# Patient Record
Sex: FEMALE | Race: OTHER | HISPANIC OR LATINO | URBAN - METROPOLITAN AREA
[De-identification: names, ages, dates, MRNs, and addresses within clinical notes are randomized per-mention and may not be internally consistent; named-entity substitution may affect disease eponyms.]

---

## 2022-07-25 ENCOUNTER — EMERGENCY (EMERGENCY)
Facility: HOSPITAL | Age: 22
LOS: 1 days | Discharge: HOME | End: 2022-07-25
Attending: EMERGENCY MEDICINE
Payer: MEDICAID

## 2022-07-25 VITALS
HEIGHT: 63 IN | WEIGHT: 199.96 LBS | RESPIRATION RATE: 18 BRPM | DIASTOLIC BLOOD PRESSURE: 87 MMHG | SYSTOLIC BLOOD PRESSURE: 169 MMHG | TEMPERATURE: 98 F | OXYGEN SATURATION: 99 % | HEART RATE: 77 BPM

## 2022-07-25 VITALS — HEART RATE: 74 BPM | DIASTOLIC BLOOD PRESSURE: 84 MMHG | SYSTOLIC BLOOD PRESSURE: 129 MMHG

## 2022-07-25 LAB
ALBUMIN SERPL ELPH-MCNC: 4.1 G/DL — SIGNIFICANT CHANGE UP (ref 3.3–5.2)
ALP SERPL-CCNC: 68 U/L — SIGNIFICANT CHANGE UP (ref 40–120)
ALT FLD-CCNC: 15 U/L — SIGNIFICANT CHANGE UP
ANION GAP SERPL CALC-SCNC: 12 MMOL/L — SIGNIFICANT CHANGE UP (ref 5–17)
AST SERPL-CCNC: 15 U/L — SIGNIFICANT CHANGE UP
BASOPHILS # BLD AUTO: 0.04 K/UL — SIGNIFICANT CHANGE UP (ref 0–0.2)
BASOPHILS NFR BLD AUTO: 0.4 % — SIGNIFICANT CHANGE UP (ref 0–2)
BILIRUB SERPL-MCNC: <0.2 MG/DL — LOW (ref 0.4–2)
BUN SERPL-MCNC: 8.8 MG/DL — SIGNIFICANT CHANGE UP (ref 8–20)
CALCIUM SERPL-MCNC: 9 MG/DL — SIGNIFICANT CHANGE UP (ref 8.6–10.2)
CHLORIDE SERPL-SCNC: 103 MMOL/L — SIGNIFICANT CHANGE UP (ref 98–107)
CO2 SERPL-SCNC: 24 MMOL/L — SIGNIFICANT CHANGE UP (ref 22–29)
CREAT SERPL-MCNC: 0.54 MG/DL — SIGNIFICANT CHANGE UP (ref 0.5–1.3)
EGFR: 133 ML/MIN/1.73M2 — SIGNIFICANT CHANGE UP
EOSINOPHIL # BLD AUTO: 0.1 K/UL — SIGNIFICANT CHANGE UP (ref 0–0.5)
EOSINOPHIL NFR BLD AUTO: 0.9 % — SIGNIFICANT CHANGE UP (ref 0–6)
GLUCOSE SERPL-MCNC: 79 MG/DL — SIGNIFICANT CHANGE UP (ref 70–99)
HCG SERPL-ACNC: <4 MIU/ML — SIGNIFICANT CHANGE UP
HCT VFR BLD CALC: 38.8 % — SIGNIFICANT CHANGE UP (ref 34.5–45)
HGB BLD-MCNC: 13.2 G/DL — SIGNIFICANT CHANGE UP (ref 11.5–15.5)
IMM GRANULOCYTES NFR BLD AUTO: 0.4 % — SIGNIFICANT CHANGE UP (ref 0–1.5)
LIDOCAIN IGE QN: 31 U/L — SIGNIFICANT CHANGE UP (ref 22–51)
LYMPHOCYTES # BLD AUTO: 29.2 % — SIGNIFICANT CHANGE UP (ref 13–44)
LYMPHOCYTES # BLD AUTO: 3.15 K/UL — SIGNIFICANT CHANGE UP (ref 1–3.3)
MCHC RBC-ENTMCNC: 28.4 PG — SIGNIFICANT CHANGE UP (ref 27–34)
MCHC RBC-ENTMCNC: 34 GM/DL — SIGNIFICANT CHANGE UP (ref 32–36)
MCV RBC AUTO: 83.4 FL — SIGNIFICANT CHANGE UP (ref 80–100)
MONOCYTES # BLD AUTO: 0.67 K/UL — SIGNIFICANT CHANGE UP (ref 0–0.9)
MONOCYTES NFR BLD AUTO: 6.2 % — SIGNIFICANT CHANGE UP (ref 2–14)
NEUTROPHILS # BLD AUTO: 6.78 K/UL — SIGNIFICANT CHANGE UP (ref 1.8–7.4)
NEUTROPHILS NFR BLD AUTO: 62.9 % — SIGNIFICANT CHANGE UP (ref 43–77)
PLATELET # BLD AUTO: 261 K/UL — SIGNIFICANT CHANGE UP (ref 150–400)
POTASSIUM SERPL-MCNC: 4.1 MMOL/L — SIGNIFICANT CHANGE UP (ref 3.5–5.3)
POTASSIUM SERPL-SCNC: 4.1 MMOL/L — SIGNIFICANT CHANGE UP (ref 3.5–5.3)
PROT SERPL-MCNC: 7.5 G/DL — SIGNIFICANT CHANGE UP (ref 6.6–8.7)
RBC # BLD: 4.65 M/UL — SIGNIFICANT CHANGE UP (ref 3.8–5.2)
RBC # FLD: 13.2 % — SIGNIFICANT CHANGE UP (ref 10.3–14.5)
SODIUM SERPL-SCNC: 138 MMOL/L — SIGNIFICANT CHANGE UP (ref 135–145)
WBC # BLD: 10.78 K/UL — HIGH (ref 3.8–10.5)
WBC # FLD AUTO: 10.78 K/UL — HIGH (ref 3.8–10.5)

## 2022-07-25 PROCEDURE — 84702 CHORIONIC GONADOTROPIN TEST: CPT

## 2022-07-25 PROCEDURE — 99284 EMERGENCY DEPT VISIT MOD MDM: CPT | Mod: 25

## 2022-07-25 PROCEDURE — 83690 ASSAY OF LIPASE: CPT

## 2022-07-25 PROCEDURE — 76705 ECHO EXAM OF ABDOMEN: CPT | Mod: 26

## 2022-07-25 PROCEDURE — 80053 COMPREHEN METABOLIC PANEL: CPT

## 2022-07-25 PROCEDURE — 76705 ECHO EXAM OF ABDOMEN: CPT

## 2022-07-25 PROCEDURE — 99285 EMERGENCY DEPT VISIT HI MDM: CPT

## 2022-07-25 PROCEDURE — 85025 COMPLETE CBC W/AUTO DIFF WBC: CPT

## 2022-07-25 PROCEDURE — 36415 COLL VENOUS BLD VENIPUNCTURE: CPT

## 2022-07-25 RX ORDER — CALCIUM CARBONATE 500(1250)
2 TABLET ORAL ONCE
Refills: 0 | Status: COMPLETED | OUTPATIENT
Start: 2022-07-25 | End: 2022-07-25

## 2022-07-25 RX ADMIN — Medication 2 TABLET(S): at 18:20

## 2022-07-25 NOTE — ED STATDOCS - PATIENT PORTAL LINK FT
Sent over updated information to Loly Hope (attn:Isabel fax [phone])  Awaiting determination for pt to go to [facility] today  You can access the FollowMyHealth Patient Portal offered by [facility] by registering at the following website: [email or web address]. By joining [employer]’s FollowMyHealth portal, you will also be able to view your health information using other applications (apps) compatible with our system.

## 2022-07-25 NOTE — ED STATDOCS - NSFOLLOWUPINSTRUCTIONS_ED_ALL_ED_FT
- Please follow-up with your primary care doctor in the next 1-2 days.  Please call tomorrow for an appointment.  If you cannot follow-up with your primary care doctor please return to the ED for any urgent issues.  - You were given a copy of the tests performed today.  Please bring the results with you and review them with your primary care doctor.  - Follow up with the gastroenterologist for the abdominal pain in the next 2-3 days.   - If you have any worsening of symptoms or any other concerns please return to the ED immediately.  - Please continue taking your home medications as directed.         Abdominal Pain    Many things can cause abdominal pain. Many times, abdominal pain is not caused by a disease and will improve without treatment. Your health care provider will do a physical exam to determine if there is a dangerous cause of your pain; blood tests and imaging may help determine the cause of your pain. However, in many cases, no cause may be found and you may need further testing as an outpatient. Monitor your abdominal pain for any changes.     SEEK IMMEDIATE MEDICAL CARE IF YOU HAVE ANY OF THE FOLLOWING SYMPTOMS: worsening abdominal pain, uncontrollable vomiting, profuse diarrhea, inability to have bowel movements or pass gas, black or bloody stools, fever accompanying chest pain or back pain, or fainting. These symptoms may represent a serious problem that is an emergency. Do not wait to see if the symptoms will go away. Get medical help right away. Call 911 and do not drive yourself to the hospital. - Please follow-up with your primary care doctor in the next 1-2 days.  Please call tomorrow for an appointment.  If you cannot follow-up with your primary care doctor please return to the ED for any urgent issues.  - You were given a copy of the tests performed today.  Please bring the results with you and review them with your primary care doctor.  - Follow up with the gastroenterologist for the abdominal pain in the next 2-3 days.   - If you have any worsening of symptoms or any other concerns please return to the ED immediately.  - Please continue taking your home medications as directed.     Abdominal Pain    Many things can cause abdominal pain. Many times, abdominal pain is not caused by a disease and will improve without treatment. Your health care provider will do a physical exam to determine if there is a dangerous cause of your pain; blood tests and imaging may help determine the cause of your pain. However, in many cases, no cause may be found and you may need further testing as an outpatient. Monitor your abdominal pain for any changes.     SEEK IMMEDIATE MEDICAL CARE IF YOU HAVE ANY OF THE FOLLOWING SYMPTOMS: worsening abdominal pain, uncontrollable vomiting, profuse diarrhea, inability to have bowel movements or pass gas, black or bloody stools, fever accompanying chest pain or back pain, or fainting. These symptoms may represent a serious problem that is an emergency. Do not wait to see if the symptoms will go away. Get medical help right away. Call 911 and do not drive yourself to the hospital.    - Conor un seguimiento con limon médico de atención primaria en los próximos 1 o 2 rocky. Por favor llame mañana para rufino lynn. Si no puede hacer un seguimiento con limon médico de atención primaria, regrese al servicio de urgencias para cualquier problema urgente.  - Se le entregó rufino copia de las pruebas realizadas hoy. Traiga los resultados con usted y revíselos con limon médico de atención primaria.  - Seguimiento con el gastroenterólogo por el dolor abdominal en los próximos 2-3 días.  - Si tiene algún empeoramiento de los síntomas o cualquier otra inquietud, regrese al servicio de urgencias de inmediato.  - Continúe tomando carol ann medicamentos en el hogar según las indicaciones.    Dolor abdominal    Muchas cosas pueden causar dolor abdominal. Muchas veces, el dolor abdominal no es causado por rufino enfermedad y mejorará sin tratamiento. Limon proveedor de atención médica le hará un examen físico para determinar si existe rufino causa peligrosa de limon dolor; Los análisis de aury y las imágenes pueden ayudar a determinar la causa de limon dolor. Sin embargo, en muchos casos, es posible que no se encuentre la causa y es posible que necesite más pruebas bryan paciente ambulatorio. Supervise limon dolor abdominal para detectar cualquier cambio.    BUSQUE ATENCIÓN MÉDICA INMEDIATA SI TIENE ALGUNO DE LOS SIGUIENTES SÍNTOMAS: empeoramiento del dolor abdominal, vómitos incontrolables, diarrea profusa, incapacidad para defecar o expulsar gases, heces negras o con aury, fiebre que acompaña al dolor de pecho o de espalda, o desmayo. Estos síntomas pueden representar un problema grave que es rufino emergencia. No espere a linnea si los síntomas desaparecen. Obtenga ayuda médica de inmediato. Llame al 911 y no conduzca hasta el hospital.

## 2022-07-25 NOTE — ED ADULT NURSE NOTE - DOES PATIENT HAVE ADVANCE DIRECTIVE
TAKE 1 TABLET BY MOUTH EVERY DAY 30 tablet 1    QUEtiapine (SEROQUEL) 100 MG tablet Take 100 mg by mouth nightly       No current facility-administered medications for this visit. Review of Systems  General: No chills, fatigue, fever, malaise, night sweats, weight gain,  weight loss. Psychological: +anxiety, +depression, No suicidal ideation   Ophthalmic: No blurry vision, decreased vision, double vision, loss of vision  Ear Nose Throat: No hearing loss, tinnitus, phonophobia, sensitivity to smells, vertigo, or vocal changes. Allergy/Immunology: No watery eyes, itchy eyes, frequent infections. Hematological and Lymphatic: No bleeding problems, blood clots, bruising  Endocrine:  No polydypsia, polyuria, temperature intolerance. Respiratory: No cough, shortness of breath, wheezing. Cardiovascular: No syncope, chest pain, dyspnea on exertion,palpitations. Gastrointestinal: No abdominal pain, hematemesis, melena, nausea, vomiting, stool incontinence  Genito-Urinary: No dysuria, hematuria, incontinence   Musculoskeletal: Per HPI  Neurological: Per HPI  Dermatological:  No rash      Physical Exam:   Blood pressure 130/88, pulse 75, height 5' 4\" (1.626 m), weight 200 lb (90.7 kg), SpO2 98 %, not currently breastfeeding. General: The patient is in no apparent distress. HEENT: No rhinorrhea, sneezing, yawning, or lacrimation. No scleral icterus or conjunctival injection. SKIN: No piloerection. No track marks. No rash. Normal turgor. No erythema or ecchymosis. Psychological: Mood and affect are appropriate. Hygiene is appropriate. Cardiovascular:  Heart is regular rate. Peripheral pulses are 1+ at the dorsalis pedis, posterior tibial and radial arteries. There is no edema. Respiratory: Respirations are regular and unlabored. There is no cyanosis. Lymphatic: There is no cervical lymphadenopathy. Gastrointestinal: Soft abdomen, non-tender.   Genitourinary: No costovertebral angle No

## 2022-07-25 NOTE — ED STATDOCS - PROGRESS NOTE DETAILS
PT evaluated by intake physician. HPI/PE/ROS as noted above. Will follow up plan per intake physician.   Pt reassessed. PT evaluated by intake physician. HPI/PE/ROS as noted above. Will follow up plan per intake physician.   Pt reassessed, pt feeling better at this time, vss, pt able to walk, talk and vocalized plan of action. Discussed in depth and explained to pt in depth the next steps that need to be taking including proper follow up with PCP or specialists. Pt will be given GI to f/u with. All incidental findings were discussed with pt as well. Pt verbalized their concerns and all questions were answered. Pt understands dispo and wants discharge. Given good instructions when to return to ED, strict return precautions and importance of f/u.     : kam

## 2022-07-25 NOTE — ED STATDOCS - OBJECTIVE STATEMENT
21 y/o female with no significant PMHx presents with epigastric abdominal pain associated with cramps, bloating for a week. Pain is worse at night. Cramps make her feel like she is short of breath. Mild back pain. Pt states she is attempting to get pregnant. Took 4 tests, all negative. Denies any hormones or medicines. Has not taken any medicines due to uncertainty of pregnancy. No PSHx.     : Mando 21 y/o female with no significant PMHx presents with epigastric abdominal pain associated with cramps, bloating for a week. Pain is worse at night. Cramps make her feel like she is short of breath. Mild back pain. Pt states she is attempting to get pregnant. Took 4 tests, all negative. no n/v/d. No black or bloody tool. No rashes. no travel. no sick contacts. no history of GI illness. Denies any hormones or fertility treatments. Has not taken any medicines due to uncertainty of pregnancy. No PSHx.     : Mando

## 2022-07-25 NOTE — ED STATDOCS - NS ED ATTENDING STATEMENT MOD
This was a shared visit with the LILIYA. I reviewed and verified the documentation and independently performed the documented:

## 2022-07-25 NOTE — ED STATDOCS - CARE PROVIDER_API CALL
Francisco Colon)  Gastroenterology; Internal Medicine  [address] [place] [address]  Phone: [phone]  Fax: [phone]  Follow Up Time:

## 2022-07-25 NOTE — ED STATDOCS - CLINICAL SUMMARY MEDICAL DECISION MAKING FREE TEXT BOX
pt presenting with epigastric pain, bloating, worse at night. mild epigastric tenderness. will check labs, hcg, gallbladder sono, and GI cocktail. Re-assess.

## 2022-07-25 NOTE — ED ADULT TRIAGE NOTE - CHIEF COMPLAINT QUOTE
pt c/o abdominal pain, x 1 week, feels inflammation to epigastric area, c/o SOB from abd pain,   A&Ox3, resp wnl, afraid to take meds because she is trying to get pregnant

## 2022-11-27 PROBLEM — Z00.00 ENCOUNTER FOR PREVENTIVE HEALTH EXAMINATION: Status: ACTIVE | Noted: 2022-11-27

## 2022-11-29 ENCOUNTER — APPOINTMENT (OUTPATIENT)
Dept: GASTROENTEROLOGY | Facility: CLINIC | Age: 22
End: 2022-11-29

## 2023-01-23 ENCOUNTER — OUTPATIENT (OUTPATIENT)
Dept: OUTPATIENT SERVICES | Facility: HOSPITAL | Age: 23
LOS: 1 days | End: 2023-01-23
Payer: MEDICAID

## 2023-01-23 ENCOUNTER — EMERGENCY (EMERGENCY)
Facility: HOSPITAL | Age: 23
LOS: 1 days | Discharge: HOME | End: 2023-01-23
Attending: EMERGENCY MEDICINE
Payer: MEDICAID

## 2023-01-23 VITALS
WEIGHT: 199.96 LBS | RESPIRATION RATE: 18 BRPM | OXYGEN SATURATION: 98 % | HEART RATE: 109 BPM | DIASTOLIC BLOOD PRESSURE: 81 MMHG | SYSTOLIC BLOOD PRESSURE: 130 MMHG | TEMPERATURE: 98 F | HEIGHT: 65 IN

## 2023-01-23 VITALS
SYSTOLIC BLOOD PRESSURE: 112 MMHG | HEART RATE: 92 BPM | RESPIRATION RATE: 16 BRPM | DIASTOLIC BLOOD PRESSURE: 67 MMHG | TEMPERATURE: 98 F

## 2023-01-23 DIAGNOSIS — O47.02 FALSE LABOR BEFORE 37 COMPLETED WEEKS OF GESTATION, SECOND TRIMESTER: ICD-10-CM

## 2023-01-23 PROCEDURE — G0463: CPT

## 2023-01-23 PROCEDURE — 93005 ELECTROCARDIOGRAM TRACING: CPT

## 2023-01-23 PROCEDURE — 59025 FETAL NON-STRESS TEST: CPT

## 2023-01-23 PROCEDURE — 99234 HOSP IP/OBS SM DT SF/LOW 45: CPT

## 2023-01-23 PROCEDURE — 99283 EMERGENCY DEPT VISIT LOW MDM: CPT

## 2023-01-23 PROCEDURE — 93010 ELECTROCARDIOGRAM REPORT: CPT

## 2023-01-23 PROCEDURE — 99284 EMERGENCY DEPT VISIT MOD MDM: CPT

## 2023-01-23 RX ORDER — FAMOTIDINE 10 MG/ML
1 INJECTION INTRAVENOUS
Qty: 28 | Refills: 0
Start: 2023-01-23 | End: 2023-02-05

## 2023-01-23 RX ORDER — CALCIUM CARBONATE 500(1250)
1 TABLET ORAL
Qty: 56 | Refills: 0
Start: 2023-01-23 | End: 2023-02-05

## 2023-01-23 RX ORDER — FAMOTIDINE 10 MG/ML
20 INJECTION INTRAVENOUS ONCE
Refills: 0 | Status: COMPLETED | OUTPATIENT
Start: 2023-01-23 | End: 2023-01-23

## 2023-01-23 RX ORDER — ONDANSETRON 8 MG/1
4 TABLET, FILM COATED ORAL ONCE
Refills: 0 | Status: COMPLETED | OUTPATIENT
Start: 2023-01-23 | End: 2023-01-23

## 2023-01-23 RX ORDER — ACETAMINOPHEN 500 MG
975 TABLET ORAL ONCE
Refills: 0 | Status: COMPLETED | OUTPATIENT
Start: 2023-01-23 | End: 2023-01-23

## 2023-01-23 RX ADMIN — Medication 30 MILLILITER(S): at 04:40

## 2023-01-23 RX ADMIN — ONDANSETRON 4 MILLIGRAM(S): 8 TABLET, FILM COATED ORAL at 04:40

## 2023-01-23 RX ADMIN — Medication 975 MILLIGRAM(S): at 04:40

## 2023-01-23 RX ADMIN — FAMOTIDINE 20 MILLIGRAM(S): 10 INJECTION INTRAVENOUS at 04:40

## 2023-01-23 NOTE — ED PROVIDER NOTE - ATTENDING APP SHARED VISIT CONTRIBUTION OF CARE
Chest pain and throat discomfort c/w GERD, improved with acid suppression. No pregnancy complaints, will be seen in L+D for well check.

## 2023-01-23 NOTE — ED PROVIDER NOTE - OBJECTIVE STATEMENT
21 yo female PMHx 28 weeks GA presents to ED c/o multiple medical complaints. 1: Sore throat x3 days. States pain in throat causes her to throw up. +Difficulty swallowing. +Belching. Slight cough. States feels more like reflux. Medicating with tea. 2: Chest pain x4 hours. Described as constant pressure, with occasional radiation down left arm. Some SOB. No further complaints at this time.   Denies fmhx CAD at young age, h/o blood clots, fevers, congestion, abdominal pain, vaginal bleeding, leg pain/swelling.  OB: [facility]

## 2023-01-23 NOTE — ED PROVIDER NOTE - PATIENT PORTAL LINK FT
You can access the FollowMyHealth Patient Portal offered by [facility] by registering at the following website: [email or web address]. By joining [employer]’s FollowMyHealth portal, you will also be able to view your health information using other applications (apps) compatible with our system.

## 2023-01-23 NOTE — ED PROVIDER NOTE - NSFOLLOWUPINSTRUCTIONS_ED_ALL_ED_FT
- Please proceed directly to L&D.  - Over the counter Pepcid.   - Avoid caffeine, citrus based food/drink, chocolate.   - Please bring all documentation from your ED visit to any related future follow up appointment.  - Please call to schedule follow up appointment with your primary care physician within 24-48 hours.  - Please seek immediate medical attention or return to the ED for any new/worsening, signs/symptoms, or concerns.    Feel better!     - Diríjase directamente a L&D.  - Pepcid de venta clyde.  - Evite la cafeína, alimentos/bebidas a base de cítricos, chocolate.  - Traiga toda la documentación de limon visita al ED a cualquier lynn de seguimiento futura relacionada.  - Llame para programar rufino lynn de seguimiento con limon médico de atención primaria dentro de las 24 a 48 horas.  - Busque atención médica inmediata o regrese al servicio de urgencias por cualquier signo/síntoma o inquietud nuevos/empeorados.    ¡Sentirse mejor!      Enfermedad de reflujo gastroesofágico en los adultos    Gastroesophageal Reflux Disease, Adult       El reflujo gastroesofágico (RGE) ocurre cuando el ácido del estómago sube por el tubo que conecta la boca con el estómago (esófago). Normalmente, la comida baja por el esófago y se mantiene en el estómago, donde se la digiere. Sin embargo, cuando rufino persona tiene ERGE, los alimentos y el ácido estomacal suelen volver al esófago. Si esto se vuelve un problema más grave, a la persona se le puede diagnosticar rufino enfermedad llamada enfermedad de reflujo gastroesofágico (ERGE). La ERGE ocurre cuando el reflujo:  •Sucede a menudo.       •Causa síntomas frecuentes o graves.       •Causa problemas tales bryan daño en el esófago.      Cuando el ácido del estómago entra en contacto con el esófago, el ácido puede provocar inflamación en el esófago. Con el tiempo, pueden formarse pequeños agujeros (úlceras) en el revestimiento del esófago.      ¿Cuáles son las causas?    Esta afección se debe a un problema en el músculo que se encuentra entre el esófago y el estómago (esfínter esofágico inferior, o EEI). Normalmente, el EEI se thuy rufino vez que la comida pasa a través del esófago hasta el estómago. Cuando el EEI se encuentra debilitado o tiene alguna anomalía, no se thuy por completo, y eso permite que tanto la comida bryan el jugo gástrico, que es ácido, vuelvan a subir por el esófago.    El EEI puede debilitarse a causa de ciertas sustancias alimenticias, medicamentos y afecciones, que incluyen:  •El consumo de tabaco.      •Embarazo.      •Tener rufino hernia de hiato.      •Consumo de alcohol.      •Ciertos alimentos y bebidas, bryan café, chocolate, cebollas y menta.        ¿Qué incrementa el riesgo?    Es más probable que tenga esta afección si:  •Tiene un aumento del peso corporal.      •Tiene un trastorno del tejido conjuntivo.      •Zenaida antiinflamatorios no esteroideos (DONALD), bryan el ibuprofeno.        ¿Cuáles son los signos o síntomas?    Los síntomas de esta afección incluyen:  •Acidez estomacal.      •Dificultad o dolor para tragar o la sensación de tener un bulto en la garganta.      •Sabor amargo en la boca.      •Mal aliento y tener gran cantidad de saliva.      •Estómago inflamado o con malestar y eructos.      •Dolor en el pecho. El dolor de pecho puede deberse a distintas afecciones. Es importante que consulte al médico si tiene dolor de pecho.      •Dificultad para respirar o sibilancias.      •Tos chandana (crónica) o tos nocturna.      •Desgaste del esmalte dental.      •Pérdida de peso.        ¿Cómo se diagnostica?    Esta afección se puede diagnosticar en función de los antecedentes médicos y un examen físico. Para determinar si tiene ERGE leve o grave, el médico también puede controlar cómo usted reacciona al tratamiento. También pueden hacerle estudios, que incluyen los siguientes:  •Un estudio para examinarle el estómago y el esófago con rufino cámara pequeña (endoscopía).      •Rufino prueba para medir el brigitte de acidez en el esófago.      •Rufino prueba para medir cuánta presión hay en el esófago.      •Un estudio de deglución con bario común o modificado para linnea la forma, el tamaño y el funcionamiento del esófago.        ¿Cómo se trata?    El tratamiento de esta afección puede variar según la gravedad de los síntomas. El médico puede recomendarle lo siguiente:  •Cambios en la dieta.      •Medicamentos.      •Cirugía.      El objetivo del tratamiento es ayudar a aliviar los síntomas y evitar las complicaciones.      Siga estas instrucciones en limon casa:      Comida y bebida    •Siga la dieta recomendada por el médico. [place] puede incluir evitar ciertos alimentos y bebidas, por ejemplo:  •Café y té barry, con o sin cafeína.      •Bebidas que contengan alcohol.      •Bebidas energéticas y deportivas.      •Bebidas gaseosas o refrescos.      •Chocolate y cacao.      •Menta y esencia de menta.      •[place] y cebolla.      •Rábano picante.      •Alimentos condimentados, picantes y ácidos, por ejemplo, todos los tipos de pimientas, chile en polvo, galaviz en polvo, vinagre, salsas picantes y salsa barbacoa.      •Cítricos y carol ann jugos, por ejemplo, naranjas, adrian y delmy.      •Alimentos a base de tomate, bryan salsa de tomate, chile, salsa picante y pizza con salsa de tomate.      •Alimentos fritos y grasos, bryan donas, tomer fritas y aderezos ricos en grasas.      •Saw con alto contenido de grasa, bryan salchichas, y kaplan de saw campos y j carlos con mucha grasa, por ejemplo, chuletas o costillas, embutidos, jamón y tocino.      •Productos lácteos ricos en grasas, bryan leche entera, manteca y queso crema.        •Chinedu comidas pequeñas y frecuentes allen el día en lugar de comidas abundantes.      •Evite beber grandes cantidades de líquidos con las comidas.      •Evite comer 2 o 3 horas antes de acostarse.      •Evite recostarse inmediatamente después de comer.      • No chinedu ejercicios enseguida después de comer.        Estilo de rere      • No consuma ningún producto que contenga nicotina o tabaco. Estos productos incluyen cigarrillos, tabaco para mascar y aparatos de vapeo, bryan los cigarrillos electrónicos. Si necesita ayuda para dejar de fumar, consulte al médico.      •Trate de reducir el estrés con métodos bryan el yoga o la meditación. Si necesita ayuda para reducir el nivel de estrés, consulte al médico.      •Si tiene sobrepeso, baje hasta llegar a un peso saludable para usted. Pídale consejos al médico para bajar de peso de manera null.      Instrucciones generales     •Esté atento a cualquier cambio en los síntomas.      •Use los medicamentos de venta clyde y los recetados solamente bryan se lo haya indicado el médico. No tome aspirina, ibuprofeno ni otros antiinflamatorios no esteroideos (DONALD) a menos que el médico le haya indicado que tome estos medicamentos.      •Use ropa suelta. No use nada apretado alrededor de la cintura que chinedu presión sobre el abdomen.      •Levante (eleve) la cabecera de la cama aproximadamente 6 pulgadas (15 cm). Para hacerlo puede usar rufino cuña.      •Evite inclinarse si al hacerlo empeoran los síntomas.      •Cumpla con todas las visitas de seguimiento. [place] es importante.        Comuníquese con un médico si:  •Tiene los siguientes síntomas:  •Síntomas nuevos.      •Pérdida de peso sin causa aparente.      •Dificultad o dolor al tragar.      •Sibilancias o rufino tos persistente.      •Voz ronca.        •Los síntomas no mejoran con el tratamiento.        Solicite ayuda de inmediato si:    •Siente dolor repentino en los brazos, el josh, la mandíbula, los dientes o la espalda.      •De repente se siente transpirado, mareado o aturdido.      •Siente falta de aire o dolor en el pecho.      •Vomita y el vómito es de color eliseo, amarillo o barry, o tiene un aspecto similar a la aury o a los posos de café.      •Se desmaya.      •Tiene heces campos, sanguinolentas o negras.      •No puede tragar, beber o comer.      Estos síntomas pueden representar un problema grave que constituye rufino emergencia. No espere a linnea si los síntomas desaparecen. Solicite atención médica de inmediato. Comuníquese con el servicio de emergencias de limon localidad (911 en los [place]). No conduzca por carol ann propios medios hasta el hospital.       Resumen    •El reflujo gastroesofágico ocurre cuando el ácido del estómago sube al esófago. La ERGE es rufino enfermedad en la que el reflujo ocurre con frecuencia, causa síntomas frecuentes o graves, o causa problemas tales bryan daño en el esófago.      •El tratamiento de esta afección puede variar según la gravedad de los síntomas. El médico puede indicarle que siga rufino dieta y chinedu cambios en limon estilo de rere, tome medicamentos o se someta a rufino cirugía.      •Comuníquese con un médico si tiene síntomas nuevos o los síntomas empeoran.      •Use los medicamentos de venta clyde y los recetados solamente bryan se lo haya indicado el médico. No tome aspirina, ibuprofeno ni otros antiinflamatorios no esteroideos (DONALD) a menos que el médico se lo indique.      •Concurra a todas las visitas de seguimiento bryan se lo haya indicado el médico. [place] es importante.      Esta información no tiene bryan fin reemplazar el consejo del médico. Asegúrese de hacerle al médico cualquier pregunta que tenga.

## 2023-01-23 NOTE — ED PROVIDER NOTE - CLINICAL SUMMARY MEDICAL DECISION MAKING FREE TEXT BOX
21 yo female PMHx 28 weeks GA presents to ED c/o sore throat and chest pain, consistent with reflux. Improved after medications. EKG without signs of ischemia. Patient TBDC to L&D for monitoring.

## 2023-01-23 NOTE — ED PROVIDER NOTE - PHYSICAL EXAMINATION
General: In NAD, non-toxic/well-appearing.  Skin: No rashes or lesions. Warm, dry, color normal for race.   Head: Normocephalic/atraumatic.   Eyes: Sclera anicteric, conjunctivae clear b/l. PERRLA, EOMI.   Throat: Moist mucus membranes. Tonsils and pharynx without erythema or exudates. Tonsils not enlarged. Uvula midline, rises symmetrically.  Neck: Supple, FROM.   Cardio: +Reproducible left sided chest tenderness. Tachycardic, rhythm regular. No audible murmur.  PV: No calf swelling/tenderness.   Resp: Breath sounds vesicular, symmetrical and without rales, rhonchi or wheezing b/l.  Abd: Gravid. Nontender. No rebound or guarding.   MSK: MAEx4. FROM.   Neuro: A&Ox3.

## 2023-01-23 NOTE — OB PROVIDER TRIAGE NOTE - HISTORY OF PRESENT ILLNESS
Patient is a 23yo  at 28w who presents from ED for obstetric clearance after being worked up for chest pain and reflux in the ED. She described feeling constant chest pressure with radiation down left arm and burning sensation in throat. She states that her symptoms improved with Zofran, Pepcid, and Maalox. She denies vaginal bleeding, leakage of fluid, contractions. She reports good fetal movement.    Preg course:  Hyperemesis of pregnancy, not taking any medications    ObHx: G1 ()  uncomplicated  GynHx: -fibroids, -cysts, -abnormal Paps, -STIs  MedHx: denies  SurgHx: denies  Meds: none  Allergies: NKDA

## 2023-01-23 NOTE — OB PROVIDER TRIAGE NOTE - NSHPPHYSICALEXAM_GEN_ALL_CORE
Vital Signs Last 24 Hrs  T(C): 36.9 (23 Jan 2023 03:11), Max: 36.9 (23 Jan 2023 03:11)  T(F): 98.5 (23 Jan 2023 03:11), Max: 98.5 (23 Jan 2023 03:11)  HR: 109 (23 Jan 2023 03:11) (109 - 109)  BP: 130/81 (23 Jan 2023 03:11) (130/81 - 130/81)  BP(mean): --  RR: 18 (23 Jan 2023 03:11) (18 - 18)  SpO2: 98% (23 Jan 2023 03:11) (98% - 98%)    Parameters below as of 23 Jan 2023 03:11  Patient On (Oxygen Delivery Method): room air    Gen: AAOx3  Abd: soft, gravid  Ext: no tenderness to calf palpation    FHT: baseline 130, moderate variability, + accels, -decels  [place]: no ctx

## 2023-01-23 NOTE — ED ADULT TRIAGE NOTE - CHIEF COMPLAINT QUOTE
Patient ambulated into ED with steady gait, Pt c/o sore throat since Friday and vomiting tonight since about 11pm left side chest pain goes down arm. Pt is 28 weeks pregnant.

## 2023-01-23 NOTE — OB PROVIDER TRIAGE NOTE - NSOBPROVIDERNOTE_OBGYN_ALL_OB_FT
Patient is a 23yo  at 28w who presents from ED for obstetric clearance after being worked up for chest pain and reflux in the ED, symptomatically improved after medications  -Vital signs stable  -Fetus: reactive and reassuring  -[place]: none  -No longer has chest pain or reflux symptoms  -Sent Pepcid and Tums   -Patient has OB appointment Friday    Discussed with Dr. Pedraza Patient is a 23yo  at 28w who presents from ED for obstetric clearance after being worked up for chest pain and reflux in the ED, symptomatically improved after medications  -Vital signs stable  -Fetus: reactive and reassuring  -[place]: none  -No longer has chest pain or reflux symptoms  -Sent Pepcid and Tums   -Patient has OB appointment Friday    Discussed with Dr. Pedraza    Addendum:    Subjective Hx, Physical Exam, Laboratory & Imaging results reviewed.  I agree with the Resident Physician's assessment and plan of care, as discussed above.  Call, follow up, and return to Hospital parameters reviewed at length.  She was given the opportunity to ask questions and all were addressed.  Discharge home    Deniz Pedraza, DO

## 2023-02-09 NOTE — OB RN TRIAGE NOTE - NSDCHEARTRATE_OBGYN_A_OB_NU
[Normal S1, S2] : normal S1, S2 [Soft] : abdomen soft [Non Tender] : non-tender [Normal Gait] : normal gait [No Edema] : no edema [Normal] : moves all extremities, no focal deficits, normal speech [Alert and Oriented] : alert and oriented 86

## 2024-09-10 NOTE — ED ADULT NURSE NOTE - NSFALLRSKOUTCOME_ED_ALL_ED
"Daily Note     Today's date: 9/10/2024  Patient name: Diane Morrissey  : 1987  MRN: [id number]  Referring provider: Ubaldo Li Universal Safety Interventions